# Patient Record
Sex: MALE | Race: WHITE | NOT HISPANIC OR LATINO | URBAN - METROPOLITAN AREA
[De-identification: names, ages, dates, MRNs, and addresses within clinical notes are randomized per-mention and may not be internally consistent; named-entity substitution may affect disease eponyms.]

---
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
## 2025-06-10 NOTE — ED PEDIATRIC NURSE NOTE - CAS DISCH CONDITION
Not well controlled  BMI: 96% (obese for pediatric)  Weight goals reviewed and discussed  Lifestyle changes recommended for obesity:   Journal food intake daily, 30 minutes of physical activity a day, Caloric restriction, and Low carbohydrate diet      Stable